# Patient Record
Sex: FEMALE | Race: WHITE | URBAN - METROPOLITAN AREA
[De-identification: names, ages, dates, MRNs, and addresses within clinical notes are randomized per-mention and may not be internally consistent; named-entity substitution may affect disease eponyms.]

---

## 2016-09-02 LAB
HBV SURFACE AG SERPL QL IA: NORMAL
HIV 1+2 AB+HIV1 P24 AG SERPL QL IA: NORMAL
RUBELLA ANTIBODY IGG QUANTITATIVE: NORMAL IU/ML
T PALLIDUM IGG SER QL: NONREACTIVE

## 2017-02-21 LAB — GROUP B STREP PCR: NORMAL

## 2017-03-02 ENCOUNTER — HOSPITAL ENCOUNTER (INPATIENT)
Facility: CLINIC | Age: 32
LOS: 3 days | Discharge: HOME | End: 2017-03-05
Attending: OBSTETRICS & GYNECOLOGY | Admitting: OBSTETRICS & GYNECOLOGY
Payer: COMMERCIAL

## 2017-03-02 ENCOUNTER — ANESTHESIA (OUTPATIENT)
Dept: OBGYN | Facility: CLINIC | Age: 32
End: 2017-03-02
Payer: COMMERCIAL

## 2017-03-02 ENCOUNTER — ANESTHESIA EVENT (OUTPATIENT)
Dept: OBGYN | Facility: CLINIC | Age: 32
End: 2017-03-02
Payer: COMMERCIAL

## 2017-03-02 PROBLEM — O13.3 PREGNANCY-INDUCED HYPERTENSION IN THIRD TRIMESTER: Status: ACTIVE | Noted: 2017-03-02

## 2017-03-02 PROBLEM — Z36.89 ENCOUNTER FOR TRIAGE IN PREGNANT PATIENT: Status: ACTIVE | Noted: 2017-03-02

## 2017-03-02 PROBLEM — Z36.89 ENCOUNTER FOR TRIAGE IN PREGNANT PATIENT: Status: RESOLVED | Noted: 2017-03-02 | Resolved: 2017-03-02

## 2017-03-02 LAB
ALT SERPL W P-5'-P-CCNC: 16 U/L (ref 0–50)
AST SERPL W P-5'-P-CCNC: 18 U/L (ref 0–45)
ERYTHROCYTE [DISTWIDTH] IN BLOOD BY AUTOMATED COUNT: 13.8 % (ref 10–15)
GLUCOSE SERPL-MCNC: 77 MG/DL (ref 70–99)
HCT VFR BLD AUTO: 39.2 % (ref 35–47)
HGB BLD-MCNC: 13 G/DL (ref 11.7–15.7)
MCH RBC QN AUTO: 29.2 PG (ref 26.5–33)
MCHC RBC AUTO-ENTMCNC: 33.2 G/DL (ref 31.5–36.5)
MCV RBC AUTO: 88 FL (ref 78–100)
PLATELET # BLD AUTO: 183 10E9/L (ref 150–450)
RBC # BLD AUTO: 4.45 10E12/L (ref 3.8–5.2)
URATE SERPL-MCNC: 5.1 MG/DL (ref 2.6–6)
WBC # BLD AUTO: 17 10E9/L (ref 4–11)

## 2017-03-02 PROCEDURE — 25000125 ZZHC RX 250: Performed by: OBSTETRICS & GYNECOLOGY

## 2017-03-02 PROCEDURE — 36415 COLL VENOUS BLD VENIPUNCTURE: CPT | Performed by: OBSTETRICS & GYNECOLOGY

## 2017-03-02 PROCEDURE — 84550 ASSAY OF BLOOD/URIC ACID: CPT | Performed by: OBSTETRICS & GYNECOLOGY

## 2017-03-02 PROCEDURE — 85018 HEMOGLOBIN: CPT | Performed by: OBSTETRICS & GYNECOLOGY

## 2017-03-02 PROCEDURE — 86780 TREPONEMA PALLIDUM: CPT | Performed by: OBSTETRICS & GYNECOLOGY

## 2017-03-02 PROCEDURE — 99213 OFFICE O/P EST LOW 20 MIN: CPT

## 2017-03-02 PROCEDURE — 25000128 H RX IP 250 OP 636: Performed by: ANESTHESIOLOGY

## 2017-03-02 PROCEDURE — 25800025 ZZH RX 258: Performed by: OBSTETRICS & GYNECOLOGY

## 2017-03-02 PROCEDURE — 86901 BLOOD TYPING SEROLOGIC RH(D): CPT | Performed by: OBSTETRICS & GYNECOLOGY

## 2017-03-02 PROCEDURE — 37000011 ZZH ANESTHESIA WARD SERVICE

## 2017-03-02 PROCEDURE — 99215 OFFICE O/P EST HI 40 MIN: CPT

## 2017-03-02 PROCEDURE — 86850 RBC ANTIBODY SCREEN: CPT | Performed by: OBSTETRICS & GYNECOLOGY

## 2017-03-02 PROCEDURE — 12000029 ZZH R&B OB INTERMEDIATE

## 2017-03-02 PROCEDURE — 84450 TRANSFERASE (AST) (SGOT): CPT | Performed by: OBSTETRICS & GYNECOLOGY

## 2017-03-02 PROCEDURE — 25000128 H RX IP 250 OP 636: Performed by: OBSTETRICS & GYNECOLOGY

## 2017-03-02 PROCEDURE — 40000671 ZZH STATISTIC ANESTHESIA CASE

## 2017-03-02 PROCEDURE — 25800025 ZZH RX 258: Performed by: ANESTHESIOLOGY

## 2017-03-02 PROCEDURE — 84460 ALANINE AMINO (ALT) (SGPT): CPT | Performed by: OBSTETRICS & GYNECOLOGY

## 2017-03-02 PROCEDURE — 82947 ASSAY GLUCOSE BLOOD QUANT: CPT | Performed by: OBSTETRICS & GYNECOLOGY

## 2017-03-02 PROCEDURE — 85027 COMPLETE CBC AUTOMATED: CPT | Performed by: OBSTETRICS & GYNECOLOGY

## 2017-03-02 PROCEDURE — 86900 BLOOD TYPING SEROLOGIC ABO: CPT | Performed by: OBSTETRICS & GYNECOLOGY

## 2017-03-02 RX ORDER — NALOXONE HYDROCHLORIDE 0.4 MG/ML
.1-.4 INJECTION, SOLUTION INTRAMUSCULAR; INTRAVENOUS; SUBCUTANEOUS
Status: DISCONTINUED | OUTPATIENT
Start: 2017-03-02 | End: 2017-03-03

## 2017-03-02 RX ORDER — ACETAMINOPHEN 325 MG/1
650 TABLET ORAL EVERY 4 HOURS PRN
Status: DISCONTINUED | OUTPATIENT
Start: 2017-03-02 | End: 2017-03-03

## 2017-03-02 RX ORDER — PENICILLIN G POTASSIUM 5000000 [IU]/1
5 INJECTION, POWDER, FOR SOLUTION INTRAMUSCULAR; INTRAVENOUS ONCE
Status: COMPLETED | OUTPATIENT
Start: 2017-03-02 | End: 2017-03-02

## 2017-03-02 RX ORDER — IBUPROFEN 800 MG/1
800 TABLET, FILM COATED ORAL
Status: DISCONTINUED | OUTPATIENT
Start: 2017-03-02 | End: 2017-03-03

## 2017-03-02 RX ORDER — CARBOPROST TROMETHAMINE 250 UG/ML
250 INJECTION, SOLUTION INTRAMUSCULAR
Status: DISCONTINUED | OUTPATIENT
Start: 2017-03-02 | End: 2017-03-03

## 2017-03-02 RX ORDER — OXYTOCIN 10 [USP'U]/ML
10 INJECTION, SOLUTION INTRAMUSCULAR; INTRAVENOUS
Status: DISCONTINUED | OUTPATIENT
Start: 2017-03-02 | End: 2017-03-03

## 2017-03-02 RX ORDER — ONDANSETRON 2 MG/ML
4 INJECTION INTRAMUSCULAR; INTRAVENOUS EVERY 6 HOURS PRN
Status: DISCONTINUED | OUTPATIENT
Start: 2017-03-02 | End: 2017-03-03

## 2017-03-02 RX ORDER — NALBUPHINE HYDROCHLORIDE 10 MG/ML
2.5-5 INJECTION, SOLUTION INTRAMUSCULAR; INTRAVENOUS; SUBCUTANEOUS EVERY 6 HOURS PRN
Status: DISCONTINUED | OUTPATIENT
Start: 2017-03-02 | End: 2017-03-03

## 2017-03-02 RX ORDER — OXYTOCIN/0.9 % SODIUM CHLORIDE 30/500 ML
100-340 PLASTIC BAG, INJECTION (ML) INTRAVENOUS CONTINUOUS PRN
Status: COMPLETED | OUTPATIENT
Start: 2017-03-02 | End: 2017-03-03

## 2017-03-02 RX ORDER — EPHEDRINE SULFATE 50 MG/ML
5 INJECTION, SOLUTION INTRAMUSCULAR; INTRAVENOUS; SUBCUTANEOUS
Status: DISCONTINUED | OUTPATIENT
Start: 2017-03-02 | End: 2017-03-03

## 2017-03-02 RX ORDER — FENTANYL CITRATE 50 UG/ML
50-100 INJECTION, SOLUTION INTRAMUSCULAR; INTRAVENOUS
Status: DISCONTINUED | OUTPATIENT
Start: 2017-03-02 | End: 2017-03-03

## 2017-03-02 RX ORDER — METHYLERGONOVINE MALEATE 0.2 MG/ML
200 INJECTION INTRAVENOUS
Status: DISCONTINUED | OUTPATIENT
Start: 2017-03-02 | End: 2017-03-03

## 2017-03-02 RX ORDER — SODIUM CHLORIDE, SODIUM LACTATE, POTASSIUM CHLORIDE, CALCIUM CHLORIDE 600; 310; 30; 20 MG/100ML; MG/100ML; MG/100ML; MG/100ML
INJECTION, SOLUTION INTRAVENOUS CONTINUOUS
Status: DISCONTINUED | OUTPATIENT
Start: 2017-03-02 | End: 2017-03-03

## 2017-03-02 RX ORDER — OXYCODONE AND ACETAMINOPHEN 5; 325 MG/1; MG/1
1 TABLET ORAL
Status: DISCONTINUED | OUTPATIENT
Start: 2017-03-02 | End: 2017-03-03

## 2017-03-02 RX ADMIN — SODIUM CHLORIDE, POTASSIUM CHLORIDE, SODIUM LACTATE AND CALCIUM CHLORIDE 1000 ML: 600; 310; 30; 20 INJECTION, SOLUTION INTRAVENOUS at 19:01

## 2017-03-02 RX ADMIN — SODIUM CHLORIDE, POTASSIUM CHLORIDE, SODIUM LACTATE AND CALCIUM CHLORIDE: 600; 310; 30; 20 INJECTION, SOLUTION INTRAVENOUS at 22:21

## 2017-03-02 RX ADMIN — SODIUM CHLORIDE, POTASSIUM CHLORIDE, SODIUM LACTATE AND CALCIUM CHLORIDE: 600; 310; 30; 20 INJECTION, SOLUTION INTRAVENOUS at 20:13

## 2017-03-02 RX ADMIN — FENTANYL CITRATE 100 MCG: 50 INJECTION INTRAMUSCULAR; INTRAVENOUS at 23:46

## 2017-03-02 RX ADMIN — Medication: at 20:03

## 2017-03-02 RX ADMIN — FENTANYL CITRATE 100 MCG: 50 INJECTION INTRAMUSCULAR; INTRAVENOUS at 22:45

## 2017-03-02 RX ADMIN — PENICILLIN G POTASSIUM 5 MILLION UNITS: 5000000 POWDER, FOR SOLUTION INTRAMUSCULAR; INTRAPLEURAL; INTRATHECAL; INTRAVENOUS at 19:15

## 2017-03-02 RX ADMIN — SODIUM CHLORIDE 2.5 MILLION UNITS: 9 INJECTION, SOLUTION INTRAVENOUS at 23:04

## 2017-03-02 RX ADMIN — SODIUM CHLORIDE, POTASSIUM CHLORIDE, SODIUM LACTATE AND CALCIUM CHLORIDE 1000 ML: 600; 310; 30; 20 INJECTION, SOLUTION INTRAVENOUS at 19:15

## 2017-03-02 NOTE — IP AVS SNAPSHOT
[facility]    [address]    [facility] [address]    Phone:  [phone]    Fax:  [phone]                                       After Visit Summary   3/2/2017    Flaquita Young    MRN: [id number]           After Visit Summary Signature Page     I have received my discharge instructions, and my questions have been answered. I have discussed any challenges I see with this plan with the nurse or doctor.    ..........................................................................................................................................  Patient/Patient Representative Signature      ..........................................................................................................................................  Patient Representative Print Name and Relationship to Patient    ..................................................               ................................................  Date                                            Time    ..........................................................................................................................................  Reviewed by Signature/Title    ...................................................              ..............................................  Date                                                            Time

## 2017-03-02 NOTE — IP AVS SNAPSHOT
MRN:[id number]                      After Visit Summary   3/2/2017    Flaquita Young    MRN: [id number]           Thank you!     Thank you for choosing [facility] for your care. Our goal is always to provide you with excellent care. Hearing back from our patients is one way we can continue to improve our services. Please take a few minutes to complete the written survey that you may receive in the mail after you visit. If you would like to speak to someone directly about your visit please contact Patient Relations at [phone]. Thank you!          Patient Information     Date Of Birth          1985        About your hospital stay     You were admitted on:  March 2, 2017 You last received care in the:  [facility]    You were discharged on:  March 5, 2017       Who to Call     For medical emergencies, please call 911.  For non-urgent questions about your medical care, please call your primary care provider or clinic, [phone]          Attending Provider     Provider Specialty    Quirino Ibrahim MD OB/Gyn       Primary Care Provider Office Phone # Fax #    [address] [phone] [address] [place] [address]        After Care Instructions     Activity       Review discharge instructions            Diet       Resume previous diet            Discharge Instructions - Gestational diabetic patients       Gestational diabetic patients to follow up for fasting blood sugar and 2 hour 75gm glucose load at 6 weeks postpartum.            Discharge Instructions - Postpartum visit       Schedule postpartum visit with your OB provider and return to clinic in 6 weeks.                  Further instructions from your care team       Postpartum Vaginal Delivery Instructions    Activity       Ask family and friends for help when you need it.    Do not place anything in your vagina for 6 weeks.    You are not restricted on other  activities, but take it easy for a few weeks to allow your body to recover from delivery.  You are able to do any activities you feel up to that point.    No driving until you have stopped taking your pain medications (usually two weeks after delivery).     Call your health care provider if you have any of these symptoms:       Increased pain, swelling, redness, or fluid around your stiches from an episiotomy or perineal tear.    A fever above 100.4 F (38 C) with or without chills when placing a thermometer under your tongue.    You soak a sanitary pad with blood within 1 hour, or you see blood clots larger than a golf ball.    Bleeding that lasts more than 6 weeks.    Vaginal discharge that smells bad.    Severe pain, cramping or tenderness in your lower belly area.    A need to urinate more frequently (use the toilet more often), more urgently (use the toilet very quickly), or it burns when you urinate.    Nausea and vomiting.    Redness, swelling or pain around a vein in your leg.    Problems breastfeeding or a red or painful area on your breast.    Chest pain and cough or are gasping for air.    Problems coping with sadness, anxiety, or depression.  If you have any concerns about hurting yourself or the baby, call your provider immediately.     You have questions or concerns after you return home.     Keep your hands clean:  Always wash your hands before touching your perineal area and stitches.  This helps reduce your risk of infection.  If your hands aren't dirty, you may use an alcohol hand-rub to clean your hands. Keep your nails clean and short.        Pending Results     No orders found from 2/28/2017 to 3/3/2017.            Statement of Approval     Ordered          03/05/17 0930  I have reviewed and agree with all the recommendations and orders detailed in this document.  EFFECTIVE NOW     Approved and electronically signed by:  Quirino Ibrahim MD             Admission Information     Date & Time  "Provider Department Dept. Phone    3/2/2017 Quirino Ibrahim MD [facility] [phone]      Your Vitals Were     Blood Pressure Temperature Respirations Height Weight Last Period    127 97.6  F (36.4  C) (Oral) 16 1.575 m (5' 2\") 64 kg (141 lb) 2016    BMI (Body Mass Index)                   25.79 kg/m2           MyChart Information     [employer] lets you send messages to your doctor, view your test results, renew your prescriptions, schedule appointments and more. To sign up, go to [email or web address] . Click on \"Log in\" on the left side of the screen, which will take you to the Welcome page. Then click on \"Sign up Now\" on the right side of the page.     You will be asked to enter the access code listed below, as well as some personal information. Please follow the directions to create your username and password.     Your access code is: [email or web address]  Expires: 6/3/2017 10:25 AM     Your access code will  in 90 days. If you need help or a new code, please call your [facility] clinic or [phone].        Care EveryWhere ID     This is your Care EveryWhere ID. This could be used by other organizations to access your [facility] medical records  [id number]           Review of your medicines      CONTINUE these medicines which have NOT CHANGED        Dose / Directions    PNV PO        Dose:  1 tablet   Take 1 tablet by mouth   Refills:  0                Protect others around you: Learn how to safely use, store and throw away your medicines at www.disposemymeds.org.             Medication List: This is a list of all your medications and when to take them. Check marks below indicate your daily home schedule. Keep this list as a reference.      Medications           Morning Afternoon Evening Bedtime As Needed    PNV PO   Take 1 tablet by mouth                                  "

## 2017-03-03 LAB
ABO + RH BLD: NORMAL
ABO + RH BLD: NORMAL
BLD GP AB SCN SERPL QL: NORMAL
BLOOD BANK CMNT PATIENT-IMP: NORMAL
GLUCOSE BLDC GLUCOMTR-MCNC: 81 MG/DL (ref 70–99)
SPECIMEN EXP DATE BLD: NORMAL
T PALLIDUM IGG+IGM SER QL: NEGATIVE

## 2017-03-03 PROCEDURE — 3E0S3CZ INTRODUCTION OF REGIONAL ANESTHETIC INTO EPIDURAL SPACE, PERCUTANEOUS APPROACH: ICD-10-PCS | Performed by: OBSTETRICS & GYNECOLOGY

## 2017-03-03 PROCEDURE — 99212 OFFICE O/P EST SF 10 MIN: CPT

## 2017-03-03 PROCEDURE — 10907ZC DRAINAGE OF AMNIOTIC FLUID, THERAPEUTIC FROM PRODUCTS OF CONCEPTION, VIA NATURAL OR ARTIFICIAL OPENING: ICD-10-PCS | Performed by: OBSTETRICS & GYNECOLOGY

## 2017-03-03 PROCEDURE — 00000146 ZZHCL STATISTIC GLUCOSE BY METER IP

## 2017-03-03 PROCEDURE — 12000029 ZZH R&B OB INTERMEDIATE

## 2017-03-03 PROCEDURE — 72200001 ZZH LABOR CARE VAGINAL DELIVERY SINGLE

## 2017-03-03 PROCEDURE — 25000125 ZZHC RX 250: Performed by: OBSTETRICS & GYNECOLOGY

## 2017-03-03 PROCEDURE — 00HU33Z INSERTION OF INFUSION DEVICE INTO SPINAL CANAL, PERCUTANEOUS APPROACH: ICD-10-PCS | Performed by: OBSTETRICS & GYNECOLOGY

## 2017-03-03 PROCEDURE — 25000132 ZZH RX MED GY IP 250 OP 250 PS 637: Performed by: OBSTETRICS & GYNECOLOGY

## 2017-03-03 RX ORDER — DEXTROSE MONOHYDRATE 25 G/50ML
25-50 INJECTION, SOLUTION INTRAVENOUS
Status: DISCONTINUED | OUTPATIENT
Start: 2017-03-03 | End: 2017-03-05 | Stop reason: HOSPADM

## 2017-03-03 RX ORDER — OXYTOCIN/0.9 % SODIUM CHLORIDE 30/500 ML
100 PLASTIC BAG, INJECTION (ML) INTRAVENOUS CONTINUOUS
Status: DISCONTINUED | OUTPATIENT
Start: 2017-03-03 | End: 2017-03-05 | Stop reason: HOSPADM

## 2017-03-03 RX ORDER — OXYCODONE HYDROCHLORIDE 5 MG/1
5-10 TABLET ORAL
Status: DISCONTINUED | OUTPATIENT
Start: 2017-03-03 | End: 2017-03-05 | Stop reason: HOSPADM

## 2017-03-03 RX ORDER — IBUPROFEN 400 MG/1
400-800 TABLET, FILM COATED ORAL EVERY 6 HOURS PRN
Status: DISCONTINUED | OUTPATIENT
Start: 2017-03-03 | End: 2017-03-05 | Stop reason: HOSPADM

## 2017-03-03 RX ORDER — AMOXICILLIN 250 MG
1-2 CAPSULE ORAL 2 TIMES DAILY
Status: DISCONTINUED | OUTPATIENT
Start: 2017-03-03 | End: 2017-03-05 | Stop reason: HOSPADM

## 2017-03-03 RX ORDER — OXYTOCIN 10 [USP'U]/ML
10 INJECTION, SOLUTION INTRAMUSCULAR; INTRAVENOUS
Status: DISCONTINUED | OUTPATIENT
Start: 2017-03-03 | End: 2017-03-05 | Stop reason: HOSPADM

## 2017-03-03 RX ORDER — OXYTOCIN/0.9 % SODIUM CHLORIDE 30/500 ML
340 PLASTIC BAG, INJECTION (ML) INTRAVENOUS CONTINUOUS PRN
Status: DISCONTINUED | OUTPATIENT
Start: 2017-03-03 | End: 2017-03-05 | Stop reason: HOSPADM

## 2017-03-03 RX ORDER — BISACODYL 10 MG
10 SUPPOSITORY, RECTAL RECTAL DAILY PRN
Status: DISCONTINUED | OUTPATIENT
Start: 2017-03-05 | End: 2017-03-05 | Stop reason: HOSPADM

## 2017-03-03 RX ORDER — NALOXONE HYDROCHLORIDE 0.4 MG/ML
.1-.4 INJECTION, SOLUTION INTRAMUSCULAR; INTRAVENOUS; SUBCUTANEOUS
Status: DISCONTINUED | OUTPATIENT
Start: 2017-03-03 | End: 2017-03-05 | Stop reason: HOSPADM

## 2017-03-03 RX ORDER — LANOLIN 100 %
OINTMENT (GRAM) TOPICAL
Status: DISCONTINUED | OUTPATIENT
Start: 2017-03-03 | End: 2017-03-05 | Stop reason: HOSPADM

## 2017-03-03 RX ORDER — NICOTINE POLACRILEX 4 MG
15-30 LOZENGE BUCCAL
Status: DISCONTINUED | OUTPATIENT
Start: 2017-03-03 | End: 2017-03-05 | Stop reason: HOSPADM

## 2017-03-03 RX ORDER — HYDROCORTISONE 2.5 %
CREAM (GRAM) TOPICAL 3 TIMES DAILY PRN
Status: DISCONTINUED | OUTPATIENT
Start: 2017-03-03 | End: 2017-03-05 | Stop reason: HOSPADM

## 2017-03-03 RX ORDER — MISOPROSTOL 200 UG/1
400 TABLET ORAL
Status: DISCONTINUED | OUTPATIENT
Start: 2017-03-03 | End: 2017-03-05 | Stop reason: HOSPADM

## 2017-03-03 RX ORDER — ACETAMINOPHEN 325 MG/1
650 TABLET ORAL EVERY 4 HOURS PRN
Status: DISCONTINUED | OUTPATIENT
Start: 2017-03-03 | End: 2017-03-05 | Stop reason: HOSPADM

## 2017-03-03 RX ADMIN — SENNOSIDES AND DOCUSATE SODIUM 1 TABLET: 8.6; 5 TABLET ORAL at 08:12

## 2017-03-03 RX ADMIN — IBUPROFEN 800 MG: 400 TABLET ORAL at 08:12

## 2017-03-03 RX ADMIN — IBUPROFEN 800 MG: 400 TABLET ORAL at 20:59

## 2017-03-03 RX ADMIN — SENNOSIDES AND DOCUSATE SODIUM 1 TABLET: 8.6; 5 TABLET ORAL at 20:59

## 2017-03-03 RX ADMIN — ACETAMINOPHEN 650 MG: 325 TABLET, FILM COATED ORAL at 13:57

## 2017-03-03 RX ADMIN — IBUPROFEN 800 MG: 400 TABLET ORAL at 15:11

## 2017-03-03 RX ADMIN — OXYTOCIN-SODIUM CHLORIDE 0.9% IV SOLN 30 UNIT/500ML 340 ML/HR: 30-0.9/5 SOLUTION at 01:53

## 2017-03-03 RX ADMIN — ACETAMINOPHEN 650 MG: 325 TABLET, FILM COATED ORAL at 19:31

## 2017-03-03 RX ADMIN — IBUPROFEN 800 MG: 400 TABLET ORAL at 03:23

## 2017-03-03 NOTE — PLAN OF CARE
Problem: Goal Outcome Summary  Goal: Goal Outcome Summary  Outcome: Improving  Meeting goals for shift and DOD. Up steady on feet and able to void without difficulty. Blood pressure improved at afternoon check. Patient caring for self and infant in room and bonding well. Using tylenol and IBU for comfort.  Anticipate D/C PPD 2.

## 2017-03-03 NOTE — ANESTHESIA PROCEDURE NOTES
Peripheral nerve/Neuraxial procedure note :        Assessment/Narrative  .  .  . Comments:  Pre-Procedure  Performed by Dagoberto Emmanuel MD  Location: OB.      PreAnesthestic Checklist: patient identified, IV checked, risks and benefits discussed, informed consent obtained, monitors and equipment checked, pre-op evaluation and at physician/surgeon's request.    Timeout   Correct Patient: Yes  Correct Procedure: Epidural catheter placement  Correct Site: Yes   Correct Position: Yes    Procedure Documentation  Procedure:   Epidural catheter block for Labor    Patient currently in labor and she and OBMD request a labor epidural to control her labor pains. Patient was interviewed and examined. Procedure and risks including but not limited to bleeding, infection, nerve injury, paralysis, PDPH, and inadequate block requiring intervention discussed with patient. Questions answered. This epidural is to be placed in anticipation of vaginal delivery.  She consents to the epidural procedure.  Time-out was performed.  I or my partners remain immediately available for management of any issues or complications and will monitor at appropriate intervals.  Procedure: Patient sitting. Betadine prep x 3. Sterile drape applied.  Lidocaine 1%  local infiltration at L 3-4.  17 G. Tuohy needle at L3-4 by loss of resistance into epidural space.  No CSF, paresthesia or blood. 1.5 % Lidocaine with 1:200,000 Epinephrine 5cc test dose. Then 0.25% bupivicaine 10 cc with NS 5 cc.  Epidural catheter inserted w/o resistance to 5 cm in epidural space.  Aspiration negative for blood and CSF.   Negative for neuro change, paresthesia or symptoms of intravascular injection or intrathecal injection.  Infusion orders written and infusion of 0.125% bupivicaine 15cc per hour started.    Dagoberto Emmanuel MD

## 2017-03-03 NOTE — PLAN OF CARE
Problem: Goal Outcome Summary  Goal: Goal Outcome Summary  Outcome: Improving  Oriented to room, discussed  clipboard, mom/baby safety, Vss, firm fundus at u2 with scant rubra, still experiencing numbness bilaterally, due to void -post straight cath of 800mL at 5am, breastfeeding baby with minimal assistance, denies pain but has ibuprofen available, bonding well with baby and attentive, father at bedside, continue to monitor.

## 2017-03-03 NOTE — L&D DELIVERY NOTE
Delivery Summary    Flaquita oYung MRN# [id number]   Age: 31 year old YOB: 1985     ASSESSMENT & PLAN:     1)  S/p  - routine PP care.  2)  Gest HTN - BPs stable, no PIH sx's, normal labs.  No Mg sulfate req'd.  3)  Class A1 GDM - BS's normal.        Labor Event Times    Labor onset date:  3/2/17 Onset time:  10:15 PM   Dilation complete date:  3/3/17 Complete time:  12:55 AM   Start pushing date/time:  3/3/2017 0100            Labor Length    1st Stage (hrs):  2 (min):  40   2nd Stage (hrs):  0 (min):  53   3rd Stage (hrs):  0 (min):  4      Labor Events     labor?:  No    steroids:  None   Labor Type:  Spontaneous, Augmentation, AROM   Predominate monitoring during 1st stage:  continuous electronic fetal monitoring      Antibiotics received during labor?:  Yes   Reason for Antibiotics:  GBS   Antibiotics received for GBS:  Penicillin   Antibiotics Given (GBS):  Greater than 4 hours prior to delivery      Rupture identifier:  Rupture 1   Rupture date/time: 3/2/17 2214   Rupture type:  Artificial Rupture of Membranes   Fluid color:  Clear   Fluid odor:  Normal      Augmentation:  AROM   Augmentation date/time:  3/2/17 2214   Indications for augmentation:  Ineffective Contraction Pattern   1:1 continuous labor support provided by?:  RN          Delivery/Placenta Date and Time    Delivery Date:  3/3/17 Delivery Time:   1:48 AM   Placenta Date/Time:  3/3/2017  1:52 AM   Oxytocin given at the time of delivery:  after delivery of baby      Vaginal Counts    Initial count performed by 2 team members:   Two Team Members   Dr. Octavio Mendez RN          [employer]   Initial counts 2  5    Added to count       Final counts 2  5       Placed during labor Accounted for at the end of labor   NA    NA    NA       Final count performed by 2 team members:   Two Team Members   Belkis Ibrahim         Final count correct?:  Yes     "     Apgars    Living status:  Yes    1 Minute 5 Minute 10 Minute 15 Minute 20 Minute   Skin color: 1  1       Heart rate: 2  2       Reflex irritability: 2  2       Muscle tone: 2  2       Respiratory effort: 2  2       Total: 9  9          Apgars assigned by:  DENY SADLER RN      Cord    Vessels:  3 Vessels Complications:  Nuchal   Cord Blood Disposition:  Lab Gases Sent?:  No          Measurements    Weight:  115 oz Length:  21\"   Head circumference:  0.33 m       Skin to Skin and Feeding Plan    Skin to skin initiation date/time: 3/3/17 0154   Skin to skin with:  Mother   Skin to skin end date/time:        Labor Events and Shoulder Dystocia    Fetal Tracing Prior to Delivery:  Category 2   Fetal Tracing Comments:  Mild variable decelerations in second stage   Shoulder dystocia present?:  Neg            Delivery (Maternal) (Provider to Complete) (348102)    Episiotomy:  None   Perineal lacerations:  None    Vaginal laceration?:  No    Cervical laceration?:  No          Mother's Information  Mother: Flaquita Young #[id number]    Start of Mother's Information     IO Blood Loss  17 2215 - 17 0211    Mom's I/O Activity            End of Mother's Information  Mother: Flaquita Young #[id number]            Delivery - Provider to Complete (646112)    Delivering clinician:  MERYL REBOLLAR   Attempted Delivery Types (Choose all that apply):  Spontaneous Vaginal Delivery   Delivery Type (Choose the 1 that will go to the Birth History):  Vaginal, Spontaneous Delivery                     Other personnel:   Provider Role   DOT CORTES Delivery Nurse   LIBIA FATIMA Scrlorne            Placenta    Delayed Cord Clamping:  Done   Date/Time:  3/3/2017  1:52 AM   Removal:  Spontaneous   Disposition:  Hospital disposal      Anesthesia    Method:  Epidural   Cervical dilation at placement:  4-7         Presentation and Position    Presentation:  Vertex   Position:  Left Occiput " Anterior                    Quirino Ibrahim MD

## 2017-03-03 NOTE — ANESTHESIA POSTPROCEDURE EVALUATION
Patient: Flaquita Young    * No procedures listed *    Diagnosis:* No pre-op diagnosis entered *  Diagnosis Additional Information: labor    Anesthesia Type:  Epidural    Note:  Anesthesia Post Evaluation    Patient location during evaluation: Bedside       Comments: I or my partner was immediately available for management of this patient during epidural analgesia infusion.   Patient post labor epidural catheter, doing well.  She reports good pain relief with epidural catheter.  She denies ongoing sensorimotor block, headache, fever, chills or other complaints.  All questions answered, understanding voiced.  She will have us contacted for any questions or problems.        Last vitals:  Vitals:    03/03/17 0351 03/03/17 0447 03/03/17 0812   BP: 117/64 133/74 137/77   Resp:  16 18   Temp:  98.8  F (37.1  C) 97.6  F (36.4  C)         Electronically Signed By: Negro Brothers MD  March 3, 2017  8:43 AM

## 2017-03-03 NOTE — PROVIDER NOTIFICATION
03/02/17 1805   Provider Notification   Provider Name/Title Dr Ibrahim   Method of Notification In Department   Request Evaluate - Remote   Notification Reason Patient Arrived;SVE;Status Update     Dr Ibrahim updated re:   Pt arrived. SVE 4/60/-2. GBS pos and induction for tmw. MD states to admit patient to inpatient. Intrapartum orders received.

## 2017-03-03 NOTE — H&P
"  2017    Flaquita Young  [id number]            OB Admit History & Physical      Ms. Young  is here for contractions.    She has noticed increasing UC's tonight.  No VB or SROM.  No PIH sx's.  Fetus active.    Patient's last menstrual period was 2016.   Her Estimated Date of Delivery: Mar 16, 2017, making her 38w0d.      Estimated body mass index is 25.79 kg/(m^2) as calculated from the following:    Height as of this encounter: 1.575 m (5' 2\").    Weight as of this encounter: 64 kg (141 lb).  Her prenatal course has been w/ Dr. Allen complicated by Gestational HTN, Class A1 GDM, and Hx PTD with prior preg.    See prenatal for labs.  Positive GBS, Rubella Immune, RH Positive    EFW: 3500g    She is a 31 year old   Her OB history:   Obstetric History       T1      TAB0   SAB0   E0   M0   L2       # Outcome Date GA Lbr Dajuan/2nd Weight Sex Delivery Anes PTL Lv   3 Current            2 Term 2012 39w0d  3.43 kg (7 lb 9 oz) F Vag-Spont  N Y      Complications: Preeclampsia/Hypertension   1  2010 35w0d  2.41 kg (5 lb 5 oz) F Vag-Spont  Y Y      Complications:  premature rupture of membranes               Past Medical History   Diagnosis Date     Diabetes (H)      GDM - diet     Gestational diabetes mellitus, delivered, current hospitalization 3/2/2017     Hypertension      GHTN     Uncomplicated asthma      Inhaler prn          Past Surgical History   Procedure Laterality Date     Orthopedic surgery       scope on rt knee         No current outpatient prescriptions on file.       Allergies: Review of patient's allergies indicates no known allergies.      REVIEW OF SYSTEMS:  NEUROLOGIC:  Negative  EYES:  Negative  ENT:  Negative  GI:  Negative  :  Negative  GYN:  Negative  CV:  Negative  PULMONARY:  Negative  MUSCULOSKELETAL:  Negative  PSYCH:  Negative        Social History     Social History     Marital status:      Spouse name: N/A     Number of " "children: N/A     Years of education: N/A     Occupational History     Not on file.     Social History Main Topics     Smoking status: Never Smoker     Smokeless tobacco: Not on file     Alcohol use Yes      Comment: social     Drug use: No     Sexual activity: Yes     Partners: Male     Other Topics Concern     Not on file     Social History Narrative      No family history on file.      Exam:    Vitals:   Temp 98.9  F (37.2  C) (Oral)  Resp 18  Ht 1.575 m (5' 2\")  Wt 64 kg (141 lb)  LMP 2016  BMI 25.79 kg/m2  FHT Reactive  With contractions every  4 min and mild to moderate    Alert Awake in NAD  HEENT grossly normal  Neck: no lymphadenopathy or thryoidomegaly  Lungs CTAB  Back No CVAT  Heart RR  ABD gravid, NABS, soft, NT on exam with NT fundus palpable  Cervix is 4 cm / 80 % effaced at -1 station/ Vtx  EXT:  No edema, no calf tenderness    AROM - Clear    Labs:  PIH labs done and all normal.  Glu 77      Assessment:    1)  IUP at 38w0d    2)  Early active phase labor    3)  Gestational HTN - BP stable currently    4)  GBS Pos on PCN now    5)  Class A1 GDM - BS stable currently    Plan:    1)  Pt has epidural now and is comfortable.    2)  Pitocin augmentation prn    3)  Antic         Quirino Ibrahim MD  Dept of OB/GYN  2017     "

## 2017-03-03 NOTE — PROGRESS NOTES
[facility] Postpartum Note    S:  Patient without complaints.  No headaches, no vision changes, pain controlled. Breastfeeding and expressing for infant, low blood sugars.     O:  Vitals were reviewed  Temp: 97.6  F (36.4  C) Temp  Min: 97.6  F (36.4  C)  Max: 99.3  F (37.4  C)  Heart Rate: 66 Heart Rate  Min: 66  Max: 83  BP: 137/77 Systolic (24hrs), Av , Min:117 , Max:137   Diastolic (24hrs), Av, Min:63, Max:89      Intake/Output Summary (Last 24 hours) at 17 0843  Last data filed at 17 0430   Gross per 24 hour   Intake              600 ml   Output             2360 ml   Net            -1760 ml           Abdomen - Fundus firm, at umbilicus, nontender        Extremities - No calf tenderness, no unilateral edema    ABO   Date Value Ref Range Status   2017 B  Final     RH(D)   Date Value Ref Range Status   2017  Pos  Final     No components found for: RUBELLA    A:   Postpartum Day #0 , s/p  Spontaneous vaginal delivery - pregnancy c/b GHTN    P:  1)  Routine care--BPs stable, labs normal on admission.         2)  Anticipate DC PPD#2      Kristie Allen MD

## 2017-03-03 NOTE — PROVIDER NOTIFICATION
03/02/17 1930   Provider Notification   Provider Name/Title Dr. Emmanuel    Method of Notification At Bedside   MDA at bedside to place epidural

## 2017-03-03 NOTE — PLAN OF CARE
Data: Flaquita Young transferred to 449 via cart  at 435. Baby transferred via parent's arms.  Action: Receiving unit notified of transfer: Yes. Patient and family notified of room change. Report given to Bernice ALARCON at 0440. Patient was straight cathed prior to transferring to postpartum unit for 800 cc of urine due to not being able to urinate due to legs being too numb to transfer to bathroom.  Belongings sent to receiving unit. Accompanied by Registered Nurse. Oriented patient to surroundings. Call light within reach. ID bands double-checked with receiving RN.  Response: Patient tolerated transfer and is stable.

## 2017-03-03 NOTE — PROVIDER NOTIFICATION
03/02/17 1858   Provider Notification   Provider Name/Title Anesthesia   Method of Notification Electronic Page   Request Evaluate in Person   Notification Reason Patient Request;Pain     Anesthesia placed to enter order for an epidural.

## 2017-03-03 NOTE — PROVIDER NOTIFICATION
03/02/17 1915   Provider Notification   Provider Name/Title Dr. Emmanuel   Method of Notification Phone   MDA coming up in 5 min to place epidural for pain control in labor. Consent form signed, patient in agreement.

## 2017-03-03 NOTE — PROVIDER NOTIFICATION
03/02/17 8823   Provider Notification   Provider Name/Title Dr. Emmanuel   Method of Notification Phone   Dr. Emmanuel called for an epidural re-bolus.

## 2017-03-03 NOTE — PLAN OF CARE
Pt presents to L and D for rule out labor. Denies LOF or bleeding. States her baby is moving. Ctx started today at 1600 and have become regular. External monitors applied.

## 2017-03-03 NOTE — PROVIDER NOTIFICATION
03/02/17 9400   Provider Notification   Provider Name/Title Dr. Emmanuel   Method of Notification At Bedside   MDA at bedside to replace epidural

## 2017-03-04 LAB — GLUCOSE BLDC GLUCOMTR-MCNC: 73 MG/DL (ref 70–99)

## 2017-03-04 PROCEDURE — 12000027 ZZH R&B OB

## 2017-03-04 PROCEDURE — 25000132 ZZH RX MED GY IP 250 OP 250 PS 637: Performed by: OBSTETRICS & GYNECOLOGY

## 2017-03-04 PROCEDURE — 00000146 ZZHCL STATISTIC GLUCOSE BY METER IP

## 2017-03-04 RX ADMIN — SENNOSIDES AND DOCUSATE SODIUM 1 TABLET: 8.6; 5 TABLET ORAL at 17:17

## 2017-03-04 RX ADMIN — SENNOSIDES AND DOCUSATE SODIUM 1 TABLET: 8.6; 5 TABLET ORAL at 10:44

## 2017-03-04 RX ADMIN — IBUPROFEN 800 MG: 400 TABLET ORAL at 17:17

## 2017-03-04 RX ADMIN — ACETAMINOPHEN 650 MG: 325 TABLET, FILM COATED ORAL at 14:27

## 2017-03-04 RX ADMIN — IBUPROFEN 800 MG: 400 TABLET ORAL at 04:51

## 2017-03-04 RX ADMIN — IBUPROFEN 800 MG: 400 TABLET ORAL at 11:17

## 2017-03-04 NOTE — PLAN OF CARE
Problem: Goal Outcome Summary  Goal: Goal Outcome Summary  Outcome: No Change  VS stable. Pain controlled with Ibuprofen. Breastfeeding  independently. Voiding. Up ad ian.  @ bedside for support.

## 2017-03-04 NOTE — LACTATION NOTE
Lactation in to see patient. Per bedside nurse baby has a tight frenulum and will be seen by ENT. Patient states nursing going well. No questions or concerns at this time. Encouraged to call prn

## 2017-03-04 NOTE — PLAN OF CARE
Problem: Goal Outcome Summary  Goal: Goal Outcome Summary  Outcome: Improving  Meeting goals for shift, see flow sheet. Caring for self and infant in room and bonding well. Comfortable. Family here and present. Anticipate D/C tomorrow.

## 2017-03-04 NOTE — PLAN OF CARE
Problem: Goal Outcome Summary  Goal: Goal Outcome Summary  Outcome: Improving  Pt ambulatory and independent with self cares and infant cares.  Breastfeeding well with minimal assistance.  Tolerating regular diet well.  Pain managed well with ibuprofen.  Support person present and helpful to mom and with infant cares.  Meeting expected goals.

## 2017-03-04 NOTE — PROGRESS NOTES
"[facility] Postpartum Note    S:  Patient without complaints.  Minimal lochia.  Feeling well.    O:  Blood pressure 123/74, temperature 98.2  F (36.8  C), temperature source Oral, resp. rate 18, height 1.575 m (5' 2\"), weight 64 kg (141 lb), last menstrual period 06/20/2016, unknown if currently breastfeeding.        Urine output adequate        Abdomen - Fundus firm, at umbilicus, nontender        Extremities - No calf tenderness    A:   Postpartum Day# 1, s/p Vaginal delivery - doing well    P:  1)  Routine care        2)  Breast feeding        3)  Anticipate discharge tomorrow    Xochitl Francisco, DO          "

## 2017-03-05 VITALS
WEIGHT: 141 LBS | DIASTOLIC BLOOD PRESSURE: 76 MMHG | SYSTOLIC BLOOD PRESSURE: 127 MMHG | TEMPERATURE: 97.6 F | HEIGHT: 62 IN | BODY MASS INDEX: 25.95 KG/M2 | RESPIRATION RATE: 16 BRPM

## 2017-03-05 PROCEDURE — 25000132 ZZH RX MED GY IP 250 OP 250 PS 637: Performed by: OBSTETRICS & GYNECOLOGY

## 2017-03-05 RX ADMIN — ACETAMINOPHEN 650 MG: 325 TABLET, FILM COATED ORAL at 09:43

## 2017-03-05 RX ADMIN — SENNOSIDES AND DOCUSATE SODIUM 1 TABLET: 8.6; 5 TABLET ORAL at 09:43

## 2017-03-05 RX ADMIN — IBUPROFEN 800 MG: 400 TABLET ORAL at 00:45

## 2017-03-05 RX ADMIN — IBUPROFEN 800 MG: 400 TABLET ORAL at 07:15

## 2017-03-05 NOTE — DISCHARGE SUMMARY
"[facility] Postpartum/Discharge Note    S:  Patient without complaints.  Minimal lochia.    O:  Blood pressure 127/76, temperature 97.6  F (36.4  C), temperature source Oral, resp. rate 16, height 1.575 m (5' 2\"), weight 64 kg (141 lb), last menstrual period 06/20/2016, unknown if currently breastfeeding.        Urine output adequate        Abdomen - Fundus firm, at umbilicus, nontender        Extremities - No calf tenderness    A:   Postpartum Day# 2, s/p Vaginal delivery - doing well        Gest HTN - BPs stable        Class A1 GDM - BS's stable    P:  1)  Discharge home        2)  F/U 6 weeks w/ Primary OB        3)  Discharge meds: OTC ibuprofen      Quirino Ibrahim MD          "

## 2017-03-05 NOTE — PLAN OF CARE
Problem: Goal Outcome Summary  Goal: Goal Outcome Summary  Outcome: No Change  Vs stable. Pain controlled with Ibuprofen. Breastfeeding and supplementing with EBM, pumping. Up ad ian.  @ bedside for support.

## 2017-03-05 NOTE — PLAN OF CARE
Problem: Goal Outcome Summary  Goal: Goal Outcome Summary  Outcome: Improving        Meeting expected goals.  Independent with self and infant care. Taking Ibuprofen for discomfort. FOB here, supportive.

## 2017-03-05 NOTE — PLAN OF CARE
Problem: Goal Outcome Summary  Goal: Goal Outcome Summary  Outcome: Adequate for Discharge Date Met:  03/05/17  Meeting goals for shift and discharge to home, see flow sheet. Caring for self and infant in room and bonding well. Pumping after breast feeding. Comfortable. Ready for discharge to home.

## 2017-03-05 NOTE — PLAN OF CARE
Discharge instructions reviewed with patient-- questions answered.  Stated understanding.  Left ambulatory @ 0726

## 2017-03-05 NOTE — PLAN OF CARE
Problem: Goal Outcome Summary  Goal: Goal Outcome Summary        Started breastpumping this shift.  Baby is at 9.6% weight loss. Has sore nipple, using mother's love.